# Patient Record
Sex: FEMALE | Race: WHITE
[De-identification: names, ages, dates, MRNs, and addresses within clinical notes are randomized per-mention and may not be internally consistent; named-entity substitution may affect disease eponyms.]

---

## 2018-09-17 ENCOUNTER — HOSPITAL ENCOUNTER (OUTPATIENT)
Dept: HOSPITAL 61 - KCIC CT | Age: 43
Discharge: HOME | End: 2018-09-17
Attending: NURSE PRACTITIONER
Payer: COMMERCIAL

## 2018-09-17 DIAGNOSIS — R20.0: ICD-10-CM

## 2018-09-17 DIAGNOSIS — E05.90: Primary | ICD-10-CM

## 2018-09-17 DIAGNOSIS — I10: ICD-10-CM

## 2018-09-17 PROCEDURE — 70450 CT HEAD/BRAIN W/O DYE: CPT

## 2018-09-17 PROCEDURE — 76536 US EXAM OF HEAD AND NECK: CPT

## 2018-09-17 NOTE — [facility]
CT HEAD WO CONTRAST

 

History: Right-sided facial numbness, recent diagnosis of hypertension, 

dizziness and headaches

 

Comparison: None.

 

Technique: Noncontrast CT imaging was performed of the head.  Exposure: 

One or more of the following individualized dose reduction techniques were

utilized for this examination:  1. Automated exposure control  2. 

Adjustment of the mA and/or kV according to patient size  3. Use of 

iterative reconstruction technique.

 

Findings: No acute extra-axial or parenchymal hemorrhage is identified. 

There is no significant intra-axial mass effect, midline shift, or 

extra-axial fluid collection. The gray-white differentiation of the major 

vascular territories is preserved.  The ventricles, sulci, and cisterns 

are within normal limits in size and configuration.   The mastoid air 

cells and the visualized paranasal sinuses are aerated.  No acute 

calvarial abnormality is identified.

 

Impression:

1. No acute intracranial abnormality is identified. 

 

Electronically signed by: Abram Horan MD (9/17/2018 3:15 PM) 

[facility]

## 2018-09-17 NOTE — [facility]
EXAM: Thyroid sonogram.

 

HISTORY: Hyperthyroidism.

 

TECHNIQUE: Sonographic imaging of the thyroid was performed.

 

COMPARISON: None.

 

FINDINGS: The right thyroid lobe measures 4.0 x 1.4 x 1.1 cm. The left 

thyroid lobe measures 4.0 x 1.0 x 0.7 cm. The isthmus measures 1.9 mm. No 

discrete solid or cystic thyroid lesion is seen. The thyroid parenchyma is

slightly heterogeneous in echotexture.

 

IMPRESSION: Slightly heterogeneous thyroid parenchyma. This can be seen as

a sequela of thyroiditis. No discrete nodule is seen

 

Electronically signed by: Beryl Larsen MD (9/17/2018 4:24 PM) [place] [id number]

## 2018-10-25 ENCOUNTER — HOSPITAL ENCOUNTER (OUTPATIENT)
Dept: HOSPITAL 61 - KCIC MRI | Age: 43
Discharge: HOME | End: 2018-10-25
Attending: FAMILY MEDICINE
Payer: COMMERCIAL

## 2018-10-25 DIAGNOSIS — J45.909: ICD-10-CM

## 2018-10-25 DIAGNOSIS — G50.8: Primary | ICD-10-CM

## 2018-10-25 DIAGNOSIS — I10: ICD-10-CM

## 2018-10-25 DIAGNOSIS — H74.8X2: ICD-10-CM

## 2018-10-25 PROCEDURE — A9585 GADOBUTROL INJECTION: HCPCS

## 2018-10-25 PROCEDURE — 70553 MRI BRAIN STEM W/O & W/DYE: CPT

## 2018-10-25 NOTE — [facility]
MRI of the Brain without and with Contrast 10/25/2018

 

Clinical History: Facial numbness since July. Headaches.

 

Technique: Unenhanced T1-weighted sagittal and axial and FLAIR, 

T2-weighted and diffusion-weighted axial images of the brain were 

obtained. Additionally thin section T1-weighted and T2-weighted axial 

images through the brainstem to include the trigeminal nerves were 

obtained. After the intravenous administration of 6 cc of Gadavist, 

enhanced T1-weighted axial and coronal images of the brain were obtained. 

Additionally, enhanced thin section T1-weighted axial images the brain 

stem in the region of the trigeminal nerves were obtained.

 

Findings: Comparison is made to a CT scan of the head dated 9/17/2018.

 

The ventricles and sulci are within normal limits in size and 

configuration. No area of significant abnormal signal intensity is seen 

involving the brain parenchyma. No abnormal area of contrast enhancement 

is seen. No extra-axial fluid collection is noted. There is no MRI 

evidence of acute ischemia/infarction. Images through the trigeminal 

nerves are within normal limits. No abnormal soft tissue mass or area of 

abnormal contrast enhancement is seen. The IACs are within normal limits.

 

Mild mucosal thickening in seen scattered throughout the paranasal 

sinuses. There is a small left mastoid effusion. Normal flow voids are 

seen within the major vascular structures surrounding the brain 

parenchyma.

 

Impression: Mild paranasal sinus and mastoid disease. Otherwise negative 

study.

 

Electronically signed by: Ovidio Mandel MD (10/25/2018 3:23 PM) [facility]

## 2018-11-08 ENCOUNTER — HOSPITAL ENCOUNTER (OUTPATIENT)
Dept: HOSPITAL 61 - LAB | Age: 43
Discharge: HOME | End: 2018-11-08
Attending: PSYCHIATRY & NEUROLOGY
Payer: COMMERCIAL

## 2018-11-08 DIAGNOSIS — R20.8: Primary | ICD-10-CM

## 2018-11-08 LAB
ALBUMIN SERPL-MCNC: 3.7 G/DL (ref 3.4–5)
ALBUMIN/GLOB SERPL: 0.9 {RATIO} (ref 1–1.7)
ALP SERPL-CCNC: 58 U/L (ref 46–116)
ALT SERPL-CCNC: 21 U/L (ref 14–59)
ANION GAP SERPL CALC-SCNC: 6 MMOL/L (ref 6–14)
AST SERPL-CCNC: 10 U/L (ref 15–37)
BASOPHILS # BLD AUTO: 0 X10^3/UL (ref 0–0.2)
BASOPHILS NFR BLD: 0 % (ref 0–3)
BILIRUB SERPL-MCNC: 0.2 MG/DL (ref 0.2–1)
BUN SERPL-MCNC: 13 MG/DL (ref 7–20)
BUN/CREAT SERPL: 16 (ref 6–20)
CALCIUM SERPL-MCNC: 9.2 MG/DL (ref 8.5–10.1)
CHLORIDE SERPL-SCNC: 102 MMOL/L (ref 98–107)
CK SERPL-CCNC: 57 U/L (ref 26–192)
CO2 SERPL-SCNC: 33 MMOL/L (ref 21–32)
CREAT SERPL-MCNC: 0.8 MG/DL (ref 0.6–1)
EOSINOPHIL NFR BLD: 0.3 X10^3/UL (ref 0–0.7)
EOSINOPHIL NFR BLD: 5 % (ref 0–3)
ERYTHROCYTE [DISTWIDTH] IN BLOOD BY AUTOMATED COUNT: 12.4 % (ref 11.5–14.5)
GFR SERPLBLD BASED ON 1.73 SQ M-ARVRAT: 78.7 ML/MIN
GLOBULIN SER-MCNC: 3.9 G/DL (ref 2.2–3.8)
GLUCOSE SERPL-MCNC: 60 MG/DL (ref 70–99)
HCT VFR BLD CALC: 39.3 % (ref 36–47)
HGB BLD-MCNC: 13.1 G/DL (ref 12–15.5)
LYMPHOCYTES # BLD: 3.7 X10^3/UL (ref 1–4.8)
LYMPHOCYTES NFR BLD AUTO: 49 % (ref 24–48)
MCH RBC QN AUTO: 31 PG (ref 25–35)
MCHC RBC AUTO-ENTMCNC: 33 G/DL (ref 31–37)
MCV RBC AUTO: 93 FL (ref 79–100)
MONO #: 0.5 X10^3/UL (ref 0–1.1)
MONOCYTES NFR BLD: 6 % (ref 0–9)
NEUT #: 3 X10^3UL (ref 1.8–7.7)
NEUTROPHILS NFR BLD AUTO: 40 % (ref 31–73)
PLATELET # BLD AUTO: 253 X10^3/UL (ref 140–400)
POTASSIUM SERPL-SCNC: 3.8 MMOL/L (ref 3.5–5.1)
PROT SERPL-MCNC: 7.6 G/DL (ref 6.4–8.2)
RBC # BLD AUTO: 4.2 X10^6/UL (ref 3.5–5.4)
SODIUM SERPL-SCNC: 141 MMOL/L (ref 136–145)
T4 FREE SERPL-MCNC: 1.24 NG/DL (ref 0.76–1.46)
THYROID STIM HORMONE (TSH): 1.05 UIU/ML (ref 0.36–3.74)
WBC # BLD AUTO: 7.6 X10^3/UL (ref 4–11)

## 2018-11-08 PROCEDURE — 84443 ASSAY THYROID STIM HORMONE: CPT

## 2018-11-08 PROCEDURE — 82607 VITAMIN B-12: CPT

## 2018-11-08 PROCEDURE — 82550 ASSAY OF CK (CPK): CPT

## 2018-11-08 PROCEDURE — 85025 COMPLETE CBC W/AUTO DIFF WBC: CPT

## 2018-11-08 PROCEDURE — 85651 RBC SED RATE NONAUTOMATED: CPT

## 2018-11-08 PROCEDURE — 80053 COMPREHEN METABOLIC PANEL: CPT

## 2018-11-08 PROCEDURE — 36415 COLL VENOUS BLD VENIPUNCTURE: CPT

## 2018-11-08 PROCEDURE — 84439 ASSAY OF FREE THYROXINE: CPT

## 2018-12-10 ENCOUNTER — HOSPITAL ENCOUNTER (OUTPATIENT)
Dept: HOSPITAL 63 - US | Age: 43
Discharge: HOME | End: 2018-12-10
Payer: COMMERCIAL

## 2018-12-10 DIAGNOSIS — I65.23: Primary | ICD-10-CM

## 2018-12-10 PROCEDURE — 93880 EXTRACRANIAL BILAT STUDY: CPT

## 2018-12-10 NOTE — RAD
Clinical Indications:  Visual disturbance. 

 

Exam : Carotid Duplex with Grayscale Ultrasound and Spectral and Color 

Doppler Analysis: 

 

PQRS Compliance Statement - Stenosis calculations for CT, MR and 

conventional angiography are based upon measurement of the distal ICA 

diameter in accordance with the NASCET methodology.  Stenosis calculations

for carotid ultrasound studies are derived from validated velocity 

criteria which are known to correlate with the NASCET methodology.

 

Comparison study:  None available.

 

Findings:  The common, internal and external carotid arteries were 

examined by grayscale, color and spectral Doppler ultrasound.  Moderate 

atherosclerotic calcifications identified in the bilateral carotid bulbs.

Flow in both vertebral arteries was antegrade and normal.  The following 

are the velocities and ratios in the carotid arteries on both sides:

 

RIGHT ICA PV:                    130cm/sec

RIGHT CCA PV:                    88cm/sec

RIGHT ICA ED:                    67cm/sec

RIGHT IC/CCPV:                   1.5

RIGHT VERTEBRAL:                  antegrade flow

RIGHT % STENOSIS:                  50-69%

 

LEFT ICA PV:                    125cm/sec

LEFT CCA PV:                    116cm/sec

LEFT ICA ED:                    56cm/sec

LEFT IC/CCPV:                         1.1 

LEFT VERTEBRAL:                   antegrade flow

LEFT % STENOSIS:                  50-69%

 

<50% ICA Stenosis: PSV < 125cm/s (EDV < 40cm/s; SVR < 2.0)

50-69% ICA Stenosis: PSV < 125-229cm/s (EDV  40-99cm/s; SVR  2.0-3.9)

>70% ICA Stenosis: PSV > 230cm/s (EDV >100cm/s; SVR  >4.0)

 

Impression:

 

1.   50-69% stenosis identified in the bilateral internal carotid 

arteries. However the ICA to CCA velocity ratios are within normal range. 

Consider CT angiogram for further evaluation if clinically feasible.

 

Electronically signed by: Denzel Draper MD (12/10/2018 4:50 PM) [place] [id number]

## 2018-12-11 ENCOUNTER — HOSPITAL ENCOUNTER (OUTPATIENT)
Dept: HOSPITAL 61 - RAD | Age: 43
Discharge: HOME | End: 2018-12-11
Attending: PSYCHIATRY & NEUROLOGY
Payer: COMMERCIAL

## 2018-12-11 DIAGNOSIS — I10: ICD-10-CM

## 2018-12-11 DIAGNOSIS — E03.9: ICD-10-CM

## 2018-12-11 DIAGNOSIS — M54.2: Primary | ICD-10-CM

## 2018-12-11 PROCEDURE — 70360 X-RAY EXAM OF NECK: CPT

## 2018-12-11 PROCEDURE — 71046 X-RAY EXAM CHEST 2 VIEWS: CPT

## 2018-12-11 NOTE — RAD
2 view study of the soft tissues the neck

 

Clinical indications: Neck pain. Patient says she feels like there is a 

knot on the right side of the neck. Has had right sided facial and neck 

numbness. History of hypothyroidism.

 

FINDINGS: No soft tissue mass or soft tissue calcification of the neck is 

evident radiographically. No prevertebral soft tissue swelling is evident.

No abnormal thickening of the epiglottis or aryepiglottic folds is seen 

and no distention of the hypopharynx is evident. No significant narrowing 

of the subglottic portion of the trachea is evident.

 

IMPRESSION: Unremarkable study.

 

Electronically signed by: Adryan Hilario MD (12/11/2018 4:31 PM) [facility]

## 2018-12-11 NOTE — RAD
CHEST PA   LATERAL

 

Clinical indications: PATIENT SAYS HAS HAD RIGHT SIDE FACE AND NECK 

NUMBNESS ON RIGHT SIDE. SAID SHE WAS TOLD RIGHT SIDE CAROTID BLOCKAGE AT 

50 TO 69%? HX OF HYPOTHYROIDISM, HX OF HYPERTENSION 

 

COMPARISON: None available.

 

Findings: No acute lung infiltrate or pleural effusion or pulmonary edema 

or lung mass or pneumothorax is seen.  The heart size, pulmonary 

vasculature, mediastinum and both zak are unremarkable. The osseous 

structures appear intact.

 

Impression: No acute radiographic abnormality is seen.

 

Electronically signed by: Adryan Hilario MD (12/11/2018 4:30 PM) [facility]

## 2018-12-12 ENCOUNTER — HOSPITAL ENCOUNTER (OUTPATIENT)
Dept: HOSPITAL 61 - LAB | Age: 43
Discharge: HOME | End: 2018-12-12
Attending: PSYCHIATRY & NEUROLOGY
Payer: COMMERCIAL

## 2018-12-12 DIAGNOSIS — I77.9: Primary | ICD-10-CM

## 2018-12-12 LAB
CHOLEST SERPL-MCNC: 238 MG/DL (ref 0–200)
CHOLEST/HDLC SERPL: 2.9 {RATIO}
HDLC SERPL-MCNC: 82 MG/DL (ref 40–60)
LDLC: 140 MG/DL (ref 0–100)
TRIGL SERPL-MCNC: 82 MG/DL (ref 0–150)
VLDLC: 16 MG/DL (ref 0–40)

## 2018-12-12 PROCEDURE — 83090 ASSAY OF HOMOCYSTEINE: CPT

## 2018-12-12 PROCEDURE — 36415 COLL VENOUS BLD VENIPUNCTURE: CPT

## 2018-12-12 PROCEDURE — 80061 LIPID PANEL: CPT

## 2020-01-09 ENCOUNTER — HOSPITAL ENCOUNTER (OUTPATIENT)
Dept: HOSPITAL 61 - KCIC US | Age: 45
Discharge: HOME | End: 2020-01-09
Attending: NURSE PRACTITIONER
Payer: COMMERCIAL

## 2020-01-09 DIAGNOSIS — E05.90: Primary | ICD-10-CM

## 2020-01-09 PROCEDURE — 76536 US EXAM OF HEAD AND NECK: CPT

## 2020-01-09 NOTE — [facility]
THYROID ULTRASOUND

 

History: Right sided neck lump and numbness

 

Comparison: September 17, 2018

 

Findings:

Multiple sonographic images of the thyroid gland are submitted. Right lobe

measured 3.4 x 0.9 x 1.1 cm. Left lobe measured 3.4 x 0.5 x 1.1 cm. 

Isthmus measured 0.14 cm in AP thickness. No nodularity is demonstrated in

the thyroid gland. In the right neck near the common carotid artery and 

internal jugular vein, there is a 1.4 x 0.4 x 0.5 cm oblong focus of 

echogenicity more likely a lymph node, not considered significant 

enlarged.

 

Impression: 

 

1.  [place] focus of echogenicity in the right neck is most likely a lymph 

node, not considered significantly enlarged based on short axis dimension.

No discrete thyroid nodularity is demonstrated.

 

Electronically signed by: Abram Horan MD (1/9/2020 3:39 PM) [facility]

## 2020-09-21 ENCOUNTER — HOSPITAL ENCOUNTER (EMERGENCY)
Dept: HOSPITAL 61 - ER | Age: 45
Discharge: HOME | End: 2020-09-21
Payer: COMMERCIAL

## 2020-09-21 VITALS
SYSTOLIC BLOOD PRESSURE: 135 MMHG | DIASTOLIC BLOOD PRESSURE: 91 MMHG | SYSTOLIC BLOOD PRESSURE: 135 MMHG | DIASTOLIC BLOOD PRESSURE: 91 MMHG

## 2020-09-21 VITALS — WEIGHT: 138.89 LBS | HEIGHT: 64 IN | BODY MASS INDEX: 23.71 KG/M2

## 2020-09-21 DIAGNOSIS — N13.2: Primary | ICD-10-CM

## 2020-09-21 DIAGNOSIS — Z90.49: ICD-10-CM

## 2020-09-21 DIAGNOSIS — Z90.710: ICD-10-CM

## 2020-09-21 LAB
ALBUMIN SERPL-MCNC: 3.9 G/DL (ref 3.4–5)
ALBUMIN/GLOB SERPL: 1.1 {RATIO} (ref 1–1.7)
ALP SERPL-CCNC: 68 U/L (ref 46–116)
ALT SERPL-CCNC: 24 U/L (ref 14–59)
ANION GAP SERPL CALC-SCNC: 4 MMOL/L (ref 6–14)
APTT PPP: (no result) S
AST SERPL-CCNC: 21 U/L (ref 15–37)
BACTERIA #/AREA URNS HPF: (no result) /HPF
BASOPHILS # BLD AUTO: 0 X10^3/UL (ref 0–0.2)
BASOPHILS NFR BLD: 0 % (ref 0–3)
BILIRUB SERPL-MCNC: 0.2 MG/DL (ref 0.2–1)
BILIRUB UR QL STRIP: NEGATIVE
BUN SERPL-MCNC: 16 MG/DL (ref 7–20)
BUN/CREAT SERPL: 18 (ref 6–20)
CALCIUM SERPL-MCNC: 9.3 MG/DL (ref 8.5–10.1)
CHLORIDE SERPL-SCNC: 104 MMOL/L (ref 98–107)
CO2 SERPL-SCNC: 31 MMOL/L (ref 21–32)
CREAT SERPL-MCNC: 0.9 MG/DL (ref 0.6–1)
EOSINOPHIL NFR BLD: 0.2 X10^3/UL (ref 0–0.7)
EOSINOPHIL NFR BLD: 3 % (ref 0–3)
ERYTHROCYTE [DISTWIDTH] IN BLOOD BY AUTOMATED COUNT: 12.7 % (ref 11.5–14.5)
FIBRINOGEN PPP-MCNC: CLEAR MG/DL
GFR SERPLBLD BASED ON 1.73 SQ M-ARVRAT: 68 ML/MIN
GLOBULIN SER-MCNC: 3.5 G/DL (ref 2.2–3.8)
GLUCOSE SERPL-MCNC: 94 MG/DL (ref 70–99)
HCT VFR BLD CALC: 37.7 % (ref 36–47)
HGB BLD-MCNC: 12.4 G/DL (ref 12–15.5)
LIPASE: 128 U/L (ref 73–393)
LYMPHOCYTES # BLD: 2.2 X10^3/UL (ref 1–4.8)
LYMPHOCYTES NFR BLD AUTO: 26 % (ref 24–48)
MCH RBC QN AUTO: 30 PG (ref 25–35)
MCHC RBC AUTO-ENTMCNC: 33 G/DL (ref 31–37)
MCV RBC AUTO: 92 FL (ref 79–100)
MONO #: 0.4 X10^3/UL (ref 0–1.1)
MONOCYTES NFR BLD: 5 % (ref 0–9)
NEUT #: 5.9 X10^3/UL (ref 1.8–7.7)
NEUTROPHILS NFR BLD AUTO: 67 % (ref 31–73)
NITRITE UR QL STRIP: NEGATIVE
PH UR STRIP: 7 [PH]
PLATELET # BLD AUTO: 279 X10^3/UL (ref 140–400)
POTASSIUM SERPL-SCNC: 4.7 MMOL/L (ref 3.5–5.1)
PROT SERPL-MCNC: 7.4 G/DL (ref 6.4–8.2)
PROT UR STRIP-MCNC: 30 MG/DL
RBC # BLD AUTO: 4.12 X10^6/UL (ref 3.5–5.4)
RBC #/AREA URNS HPF: (no result) /HPF (ref 0–2)
SODIUM SERPL-SCNC: 139 MMOL/L (ref 136–145)
SQUAMOUS #/AREA URNS LPF: (no result) /LPF
UROBILINOGEN UR-MCNC: 0.2 MG/DL
WBC # BLD AUTO: 8.8 X10^3/UL (ref 4–11)
WBC #/AREA URNS HPF: (no result) /HPF (ref 0–4)

## 2020-09-21 PROCEDURE — 80053 COMPREHEN METABOLIC PANEL: CPT

## 2020-09-21 PROCEDURE — 96374 THER/PROPH/DIAG INJ IV PUSH: CPT

## 2020-09-21 PROCEDURE — 99285 EMERGENCY DEPT VISIT HI MDM: CPT

## 2020-09-21 PROCEDURE — 96361 HYDRATE IV INFUSION ADD-ON: CPT

## 2020-09-21 PROCEDURE — 96375 TX/PRO/DX INJ NEW DRUG ADDON: CPT

## 2020-09-21 PROCEDURE — 85025 COMPLETE CBC W/AUTO DIFF WBC: CPT

## 2020-09-21 PROCEDURE — 83690 ASSAY OF LIPASE: CPT

## 2020-09-21 PROCEDURE — 36415 COLL VENOUS BLD VENIPUNCTURE: CPT

## 2020-09-21 PROCEDURE — 74176 CT ABD & PELVIS W/O CONTRAST: CPT

## 2020-09-21 PROCEDURE — 81001 URINALYSIS AUTO W/SCOPE: CPT

## 2020-09-21 NOTE — PHYS DOC
Past Medical History


Past Medical History:  Other


Additional Past Medical Histor:  constant pelvic pain


Past Surgical History:  Cholecystectomy, Hysterectomy


Smoking Status:  Never Smoker


Alcohol Use:  None





General Adult


EDM:


Chief Complaint:  ABDOMINAL PAIN





HPI:


HPI:





Patient is a 45  year old female who presented to ER today for evaluation of low

abdominal pain associate with pain with urination since Saturday.  Patient went 

to see her physician today who diagnosed her with UTI and put her on some 

antibiotics.  Patient came here for evaluation because the pain became worse.  

Patient denies any cough or fever, no nausea vomiting, no exposure to anybody 

who tested positive for COVID-19.





Review of Systems:


Review of Systems:


Constitutional:   Denies fever or chills. []


Eyes:   Denies change in visual acuity. []


HENT:   Denies nasal congestion or sore throat. [] 


Respiratory:   Denies cough or shortness of breath. [] 


Cardiovascular:   Denies chest pain or edema. [] 


GI:   Positive for abdominal pain, no  nausea, vomiting, bloody stools or 

diarrhea. [] 


:  Positive for dysuria. [] 


Musculoskeletal:   Denies back pain or joint pain. [] 


Integument:   Denies rash. [] 


Neurologic:   Denies headache, focal weakness or sensory changes. [] 


Endocrine:   Denies polyuria or polydipsia. [] 


Lymphatic:  Denies swollen glands. [] 


Psychiatric:  Denies depression or anxiety. []





Heart Score:


Risk Factors:


Risk Factors:  DM, Current or recent (<one month) smoker, HTN, HLP, family 

history of CAD, obesity.


Risk Scores:


Score 0 - 3:  2.5% MACE over next 6 weeks - Discharge Home


Score 4 - 6:  20.3% MACE over next 6 weeks - Admit for Clinical Observation


Score 7 - 10:  72.7% MACE over next 6 weeks - Early Invasive Strategies





Current Medications:





Current Medications








 Medications


  (Trade)  Dose


 Ordered  Sig/[facility]  Start Time


 Stop Time Status Last Admin


Dose Admin


 


 Morphine Sulfate


  (Morphine


 Sulfate)  4 mg  1X  ONCE  20 13:00


 20 13:02 DC  





 


 Ondansetron HCl


  (Zofran)  4 mg  1X  ONCE  20 13:00


 20 13:02 DC  





 


 Sodium Chloride  1,000 ml @ 


 1,000 mls/hr  1X  ONCE  20 13:00


 20 13:59   














Allergies:


Allergies:





Allergies








Coded Allergies Type Severity Reaction Last Updated Verified


 


  No Known Drug Allergies    10/25/18 No











Physical Exam:


PE:





Constitutional: Well developed, well nourished, no acute distress, non-toxic 

appearance. []


HENT: Normocephalic, atraumatic, bilateral external ears normal, oropharynx m

oist, no oral exudates, nose normal. []


Eyes: PERRLA, EOMI, conjunctiva normal, no discharge. [] 


Neck: Normal range of motion, no tenderness, supple, no stridor. [] 


Cardiovascular:Heart rate regular rhythm, no murmur []


Lungs & Thorax:  Bilateral breath sounds clear to auscultation []


Abdomen: Bowel sounds normal, soft, There is  tenderness to palpation in 

suprapubic area, no masses, no pulsatile masses. [] 


Skin: Warm, dry, no erythema, no rash. [] 


Back: No tenderness, There is RIGHT CVA tenderness. [] 


Extremities: No tenderness, no cyanosis, no clubbing, ROM intact, no edema. [] 


Neurologic: Alert and oriented X 3, normal motor function, normal sensory 

function, no focal deficits noted. []


Psychologic: Affect normal, judgement normal, mood normal. []





Current Patient Data:


Labs:





Laboratory Tests








Test


 20


13:05 20


13:10


 


Urine Collection Type Unknown  


 


Urine Color Leilani  


 


Urine Clarity Clear  


 


Urine pH 7.0  


 


Urine Specific Gravity 1.020  


 


Urine Protein 30 mg/dL  


 


Urine Glucose (UA) Negative mg/dL  


 


Urine Ketones (Stick) Negative mg/dL  


 


Urine Blood Large  


 


Urine Nitrite Negative  


 


Urine Bilirubin Negative  


 


Urine Urobilinogen Dipstick 0.2 mg/dL  


 


Urine Leukocyte Esterase Negative  


 


Urine RBC Tntc /HPF  


 


Urine WBC 1-4 /HPF  


 


Urine Squamous Epithelial


Cells Few /LPF 


 





 


Urine Bacteria Few /HPF  


 


White Blood Count  8.8 x10^3/uL 


 


Red Blood Count  4.12 x10^6/uL 


 


Hemoglobin  12.4 g/dL 


 


Hematocrit  37.7 % 


 


Mean Corpuscular Volume  92 fL 


 


Mean Corpuscular Hemoglobin  30 pg 


 


Mean Corpuscular Hemoglobin


Concent 


 33 g/dL 





 


Red Cell Distribution Width  12.7 % 


 


Platelet Count  279 x10^3/uL 


 


Neutrophils (%) (Auto)  67 % 


 


Lymphocytes (%) (Auto)  26 % 


 


Monocytes (%) (Auto)  5 % 


 


Eosinophils (%) (Auto)  3 % 


 


Basophils (%) (Auto)  0 % 


 


Neutrophils # (Auto)  5.9 x10^3/uL 


 


Lymphocytes # (Auto)  2.2 x10^3/uL 


 


Monocytes # (Auto)  0.4 x10^3/uL 


 


Eosinophils # (Auto)  0.2 x10^3/uL 


 


Basophils # (Auto)  0.0 x10^3/uL 


 


Sodium Level  139 mmol/L 


 


Potassium Level  4.7 mmol/L 


 


Chloride Level  104 mmol/L 


 


Carbon Dioxide Level  31 mmol/L 


 


Anion Gap  4 


 


Blood Urea Nitrogen  16 mg/dL 


 


Creatinine  0.9 mg/dL 


 


Estimated GFR


(Cockcroft-Gault) 


 68.0 





 


BUN/Creatinine Ratio  18 


 


Glucose Level  94 mg/dL 


 


Calcium Level  9.3 mg/dL 


 


Total Bilirubin  0.2 mg/dL 


 


Aspartate Amino Transf


(AST/SGOT) 


 21 U/L 





 


Alanine Aminotransferase


(ALT/SGPT) 


 24 U/L 





 


Alkaline Phosphatase  68 U/L 


 


Total Protein  7.4 g/dL 


 


Albumin  3.9 g/dL 


 


Albumin/Globulin Ratio  1.1 


 


Lipase  128 U/L 








Current Medications








 Medications


  (Trade)  Dose


 Ordered  Sig/Lu


 Route


 PRN Reason  Start Time


 Stop Time Status Last Admin


Dose Admin


 


 Sodium Chloride  1,000 ml @ 


 1,000 mls/hr  1X  ONCE


 IV


   20 13:00


 20 13:59 DC 20 13:23





 


 Morphine Sulfate


  (Morphine


 Sulfate)  4 mg  1X  ONCE


 IV


   20 13:00


 20 13:02 DC 20 13:26





 


 Ondansetron HCl


  (Zofran)  4 mg  1X  ONCE


 IVP


   20 13:00


 20 13:02 DC 20 13:26





 


 Sodium Chloride  1,000 ml @ 


 1,000 mls/hr  1X  ONCE


 IV


   20 13:00


 20 13:59 DC  





 


 Ketorolac


 Tromethamine


  (Toradol 30mg


 Vial)  30 mg  1X  ONCE


 IVP


   20 14:45


 20 14:46 DC 20 14:49





 


 Multi-Ingredient


 Mouthwash/Gargle


  (Gi Cocktail)  20 ml  1X  ONCE


 SWSW


   20 15:00


 20 15:01 DC 20 15:05





 


 Ceftriaxone Sodium


  (Rocephin)  1 gm  1X  ONCE


 IVP


   20 16:00


 20 16:01 DC 20 16:00











Vital Signs:





                                   Vital Signs








  Date Time  Temp Pulse Resp B/P (MAP) Pulse Ox O2 Delivery O2 Flow Rate FiO2


 


20 12:45 98.2 72 14 142/80 (100) 97 Room Air  





 98.2       











EKG:


EKG:


[]





Radiology/Procedures:


Radiology/Procedures:


[]


Impression:


                            [facility]


                    [address] [place] [address]


                                 [phone]


                                        


                                 IMAGING REPORT





                                     Signed





PATIENT: LURDES RUTLEDGE ACCOUNT: [id number]     MRN#: [id number]


: 1975           LOCATION: ER              AGE: 45


SEX: F                    EXAM DT: 20         ACCESSION#: [id number]


STATUS: REG ER            ORD. PHYSICIAN: EDITH SEXTON DO


REASON: RIGHT SIDE FLANK PAIN


PROCEDURE: CT ABDOMEN PELVIS WO CONTRAST





EXAM: CT Abdomen and Pelvis without IV contrast


 


CLINICAL HISTORY: RIGHT SIDE FLANK PAIN


 


COMPARISON: None


 


TECHNIQUE: Helical CT of the abdomen and pelvis was performed without the 


administration of IV contrast. Axial, coronal and sagittal reformatted 


images were generated.


 


---PQRS compliance statement - One or more of the following individualized


dose reduction techniques were utilized for this study:


1.  Automated exposure control


2.  Adjustment of the mA and/or kV according to patient size


3.  Use of iterative reconstruction technique---


 


FINDINGS:


Lack of intravenous contrast limits evaluation of solid organs, 


vasculature, and lymph nodes. 


 


Lower chest: Groundglass opacities in the lower lobes dependently.


 


Abdomen and pelvis:


Liver and biliary system: No focal liver lesion.  Accounting for 


postcholecystectomy change, no biliary ductal dilatation.


 


Spleen: Unremarkable


 


Pancreas: Unremarkable


 


Adrenal glands: Unremarkable


 


Kidneys: A 5 mm calculus is seen at the right ureterovesicular junction 


resulting in moderate right hydronephrosis and hydroureter. Right 


perinephric infiltration and edema. No focal renal lesion. No left 


hydronephrosis.


 


Lymph nodes/retroperitoneum: No abdominal or pelvic lymphadenopathy.


 


Vessels: Aorta is normal in caliber.


 


Bowel/Peritoneal cavity: Moderate colonic stool content is seen, 


particularly in the cecum. No small or large bowel dilatation. No bowel 


obstruction.  Appendix is not convincingly seen.  


No abdominal or pelvic ascites.


 


Abdominal wall: Trace fat-containing periumbilical hernia.


 


Bladder: Unremarkable


 


Bones: Leftward curvature of the lumbar spine, apex L2


 


IMPRESSION:


1.  5 mm calculus at the right ureterovesicular junction resulting in 


moderate right hydronephrosis and hydroureter.


2.  Infiltration about the right kidney and right ureter may be reactive 


however superimposed ascending infection is not excluded, and this can be 


correlated with urinalysis.


3.  No bowel obstruction


 


Electronically signed by: Ranjit Abdi MD (2020 3:22 PM) [id number]














DICTATED and SIGNED BY:     RANJIT ABDI MD


DATE:     20 1522





Course & Med Decision Making:


Course & Med Decision Making


Pertinent Labs and Imaging studies reviewed. (See chart for details)





Patient is a 45-year-old female who presented to ER today for evaluation of 

right side abdominal pain, CT scan showed she had a small 5 mm stone on the r

ight UVJ junction.  Patient was given pain medication in the ER, she feels much 

better.  Patient will be discharged home with pain medication, she will follow-

up with the urology for outpatient evaluation.





Dragon Disclaimer:


Dragon Disclaimer:


This electronic medical record was generated, in whole or in part, using a voice

 recognition dictation system.





Departure


Departure


Impression:  


   Primary Impression:  


   Kidney stone on right side


Disposition:  01 HOME, SELF-CARE


Condition:  IMPROVED


Referrals:  


NIKKI MORRIS (PCP)


Patient Instructions:  Kidney Stones





Additional Instructions:  


PLEASE CALL [facility] UROLOGY DEPARTMENT FOR FOLLOW UP THIS WEEK.





The phone number is [phone]


Scripts


Levofloxacin (LEVOFLOXACIN) 500 Mg Tablet


1 TAB PO DAILY, #7 TAB


   Prov: EDITH SEXTON DO         20 


Tamsulosin Hcl (FLOMAX) 0.4 Mg Cap.er.24h


1 CAP PO DAILY, #10 CAP 11 Refills


   Prov: EDITH SEXTON DO         20 


Ibuprofen (IBUPROFEN) 600 Mg Tablet


600 MG PO PRN Q6HRS PRN for PAIN, #30 TAB


   Prov: EDITH SEXTON DO         20 


Hydrocodone/Apap 5-325 (NORCO 5-325 TABLET) 1 Each Tablet


1 TAB PO PRN Q6HRS PRN for PAIN, #15 TAB 0 Refills


   Prov: EDITH SEXTON DO         20





Justicifation of Admission Dx:


Justifications for Admission:


Justification of Admission Dx:  N/A











EDITH SEXTON DO                Sep 21, 2020 13:18

## 2020-09-21 NOTE — RAD
EXAM: CT Abdomen and Pelvis without IV contrast

 

CLINICAL HISTORY: RIGHT SIDE FLANK PAIN

 

COMPARISON: None

 

TECHNIQUE: Helical CT of the abdomen and pelvis was performed without the 

administration of IV contrast. Axial, coronal and sagittal reformatted 

images were generated.

 

---PQRS compliance statement - One or more of the following individualized

dose reduction techniques were utilized for this study:

1.  Automated exposure control

2.  Adjustment of the mA and/or kV according to patient size

3.  Use of iterative reconstruction technique---

 

FINDINGS:

Lack of intravenous contrast limits evaluation of solid organs, 

vasculature, and lymph nodes. 

 

Lower chest: [place] opacities in the lower lobes dependently.

 

Abdomen and pelvis:

Liver and biliary system: No focal liver lesion.  Accounting for 

postcholecystectomy change, no biliary ductal dilatation.

 

Spleen: Unremarkable

 

Pancreas: Unremarkable

 

Adrenal glands: Unremarkable

 

Kidneys: A 5 mm calculus is seen at the right ureterovesicular junction 

resulting in moderate right hydronephrosis and hydroureter. Right 

perinephric infiltration and edema. No focal renal lesion. No left 

hydronephrosis.

 

Lymph nodes/retroperitoneum: No abdominal or pelvic lymphadenopathy.

 

Vessels: Aorta is normal in caliber.

 

Bowel/Peritoneal cavity: Moderate colonic stool content is seen, 

particularly in the cecum. No small or large bowel dilatation. No bowel 

obstruction.  Appendix is not convincingly seen.  

No abdominal or pelvic ascites.

 

Abdominal wall: Trace fat-containing periumbilical hernia.

 

Bladder: Unremarkable

 

Bones: Leftward curvature of the lumbar spine, apex L2

 

IMPRESSION:

1.  5 mm calculus at the right ureterovesicular junction resulting in 

moderate right hydronephrosis and hydroureter.

2.  Infiltration about the right kidney and right ureter may be reactive 

however superimposed ascending infection is not excluded, and this can be 

correlated with urinalysis.

3.  No bowel obstruction

 

Electronically signed by: Ranjit Whittington MD (9/21/2020 3:22 PM) [email or web address]

## 2020-09-23 ENCOUNTER — HOSPITAL ENCOUNTER (EMERGENCY)
Dept: HOSPITAL 61 - ER | Age: 45
Discharge: LEFT BEFORE DISCHARGE | End: 2020-09-23
Payer: COMMERCIAL

## 2020-09-23 DIAGNOSIS — Z53.21: ICD-10-CM

## 2020-09-23 DIAGNOSIS — R10.9: Primary | ICD-10-CM
